# Patient Record
Sex: FEMALE | Race: BLACK OR AFRICAN AMERICAN | URBAN - METROPOLITAN AREA
[De-identification: names, ages, dates, MRNs, and addresses within clinical notes are randomized per-mention and may not be internally consistent; named-entity substitution may affect disease eponyms.]

---

## 2024-07-15 ENCOUNTER — TELEPHONE (OUTPATIENT)
Dept: ORTHOPEDIC SURGERY | Age: 39
End: 2024-07-15

## 2024-07-15 NOTE — TELEPHONE ENCOUNTER
MRI dated 10/2023 @ [facility]/This is a referral from [facility]  Please review will [facility] see?  Thank you